# Patient Record
Sex: FEMALE | URBAN - METROPOLITAN AREA
[De-identification: names, ages, dates, MRNs, and addresses within clinical notes are randomized per-mention and may not be internally consistent; named-entity substitution may affect disease eponyms.]

---

## 2018-02-12 ENCOUNTER — IMPORTED ENCOUNTER (OUTPATIENT)
Dept: URBAN - METROPOLITAN AREA CLINIC 9 | Facility: CLINIC | Age: 33
End: 2018-02-12

## 2021-10-16 ASSESSMENT — KERATOMETRY
OD_AXISANGLE2_DEGREES: 164
OS_AXISANGLE2_DEGREES: 10
OS_K1POWER_DIOPTERS: 42.75
OS_AXISANGLE_DEGREES: 100
OD_AXISANGLE_DEGREES: 74
OS_K2POWER_DIOPTERS: 43.5
OD_K2POWER_DIOPTERS: 43.25
OD_K1POWER_DIOPTERS: 43

## 2021-10-16 ASSESSMENT — TONOMETRY
OS_IOP_MMHG: 13
OD_IOP_MMHG: 15

## 2021-10-16 ASSESSMENT — VISUAL ACUITY
OD_SC: 20/40 SN
OD_CC: 20/20 SN
OS_CC: 20/20 SN
OS_SC: 20/40 SN
